# Patient Record
Sex: FEMALE | Race: WHITE | NOT HISPANIC OR LATINO | Employment: PART TIME | URBAN - METROPOLITAN AREA
[De-identification: names, ages, dates, MRNs, and addresses within clinical notes are randomized per-mention and may not be internally consistent; named-entity substitution may affect disease eponyms.]

---

## 2018-01-12 ENCOUNTER — ALLSCRIPTS OFFICE VISIT (OUTPATIENT)
Dept: OTHER | Facility: OTHER | Age: 43
End: 2018-01-12

## 2018-01-12 DIAGNOSIS — Z12.31 ENCOUNTER FOR SCREENING MAMMOGRAM FOR MALIGNANT NEOPLASM OF BREAST: ICD-10-CM

## 2018-01-14 LAB
BACTERIA UR QL AUTO: ABNORMAL
BILIRUB UR QL STRIP: NEGATIVE
CHARACTER, URINE (HISTORICAL): CLEAR
COLOR UR: YELLOW
CRYSTAL TYPE (HISTORICAL): ABNORMAL PERHPF
CRYSTALS URNS QL MICRO: ABNORMAL
CULTURE RESULT (HISTORICAL): NO GROWTH
FINE GRAN CASTS URNS QL MICRO: ABNORMAL PERLPF (ref 0–1)
GLUCOSE UR STRIP-MCNC: NEGATIVE MG/DL
HGB UR QL STRIP.AUTO: NEGATIVE
HYALINE CASTS #/AREA URNS LPF: ABNORMAL PERLPF (ref 0–4)
KETONES UR STRIP-MCNC: ABNORMAL MG/DL
LEUKOCYTE ESTERASE UR QL STRIP: ABNORMAL
NITRITE UR QL STRIP: NEGATIVE
NON-SQ EPI CELLS URNS QL MICRO: ABNORMAL
PH UR STRIP.AUTO: 6 [PH] (ref 5–8)
PROT UR-MCNC: NEGATIVE G/DL
RBC CASTS URNS QL MICRO: ABNORMAL PERLPF (ref 0–1)
SP GR UR STRIP.AUTO: 1.02 (ref 1–1.03)
URINE RBC (HISTORICAL): ABNORMAL PERHPF
UROBILINOGEN UR QL STRIP.AUTO: 0.2 MG/DL (ref 0.2–1)
WBC #/AREA URNS AUTO: ABNORMAL PERHPF (ref 0–4)

## 2018-01-16 NOTE — PROGRESS NOTES
Plan    Contraception management, Encounter for routine gynecological examination with    Papanicolaou smear of cervix    · Norgestim-Eth Estrad Triphasic 0 18/0 215/0 25 MG-35 MCG Oral Tablet; Take 1    tablet daily   Rx By: Sahil Teague; Dispense: 90 Days ; #:90 Tablet; Refill: 0;For: Contraception management, Encounter for routine gynecological examination with Papanicolaou smear of cervix; ESTELLA = N; Verified Transmission to [employer]; Last Updated By: System, [employer]; 1/12/2018 1:43:24 PM  Encounter for routine gynecological examination with Papanicolaou smear of cervix    · Norgestim-Eth Estrad Triphasic 0 18/0 215/0 25 MG-35 MCG Oral Tablet (Ortho    Tri-Cyclen (28)); TAKE 1 TABLET DAILY   Rx By: Sahil Teague; Dispense: 30 Days ; #:30 Tablet; Refill: 0;For: Encounter for routine gynecological examination with Papanicolaou smear of cervix; ESTELLA = N; Verified Transmission to [address] [place] [address]; Last Updated By: [employer]; 1/12/2018 1:39:15 PM      * MAMMO SCREENING BILATERAL W CAD; Status:Hold For - Scheduling; Requested for:12Jan2018; Perform:[facility]; TAR:[id number];FLTKCAB;       [facility] Route for screening mammogram for malignant neoplasm of breast; Ordered By:Orestes Ku;      (B) URINALYSIS, ROUTINE (W/MICROS); Status:Resulted - Requires Verification;   Done: 12EPR8980 12:00AM     FIF:[id number];IVIDYCN; For:Urinary frequency; Ordered By:Orestes Ku;      (B) URINE CULTURE; Status:Resulted - Requires Verification;   Done: 00YUF8573 12:00AM     GUB:[id number];XYSWUME; For:Urinary frequency; Ordered By:Cecille Ku; Discussion/Summary   health maintenance visit healthy adult female Currently, she eats a healthy diet and has an adequate exercise regimen   the risks and benefits of cervical cancer screening were discussed cervical cancer screening is current cervical cancer screening is needed every three years Testing was done today for declines  Breast cancer screening: the risks and benefits of breast cancer screening were discussed, self breast exam technique was taught, monthly self breast exam was advised and mammogram has been ordered  Colorectal cancer screening: colorectal cancer screening is not indicated  Osteoporosis screening: bone mineral density testing is not indicated  The immunizations are up to date  Advice and education were given regarding nutrition, calcium supplements, vitamin D supplements, reproductive health and contraception  Patient discussion: discussed with the patient  Reviewed normal exam today  Pap was not done  breast exam today  Reviewed monthly SBE week after menses  Advised mammogram, ordered  Pt stated she has rx from family doctor  Reviewed how to schedule, no lotions, deodorant, powders, etc  day of mammogram  with patient Urine dip in office today showed mild traces of blood  Will send out for U/A and culture  Reviewed patient should follow up with PCP, or urgent care, if right flank pain becomes worse, or develops fever/chills  Reviewed if urinalysis and culture come back negative pt should follow with PCP for further evaluation  Will call with results of U/A and culture  send rx to refill [facility] - one month to [place] and [facility] follows with PCP to monitor thyroid levels states recently changed dose, feeling ok  1 year for annual or sooner as needed  The patient has the current Goals: Have mammogram monthly SBE  The patent has the current Barriers: None  Patient is able to Self-Care  Possible side effects of new medications were reviewed with the patient/guardian today  The treatment plan was reviewed with the patient/guardian   The patient/guardian understands and agrees with the treatment plan       Self Referrals: No      Chief Complaint   Pt is here for an annual well woman exam       History of Present Illness   HPI: Pt here for annual well woman exam  Last exam Nov 2016 Pap with reflex to HPV done at that time WNL, Pap in 2015 WNL HPV negative  Reviewed pap guidelines  Declines pap today  monthly lasting 5-7 days  LMP 3 weeks ago  menses being too heavy, painful, or crampy  Denies any bleeding in between menses  any abnormal vaginal discharge, itching, or odor  does not practice SBE but denies any complaints today  Has not gone for mammogram  is in a mutually exclusive relationship () with a male partner and denies the need for STD/ HIV/ Hepatitis testing  OCPs Ortho Tri Cyclin, no complaints, would like to continue  any bowel issues  Pt complaining or lower right back pain x 1 week and urinary frequency x 2-3 days, pt denies any dysuria, hesitancy, blood in urine  Denies any fever or chills  with PCP for regular check-ups and blood work  GYN , Adult Female [facility]: The patient is being seen for a health maintenance evaluation  The last health maintenance visit was 1 year(s) ago  Social History: Household members include spouse  She is   Work status: occupation: Pharmacy tech  The patient is a former cigarette smoker  She reports never drinking alcohol  The patient has no concerns about alcohol abuse  She has never used illicit drugs  General Health: The patient's health since the last visit is described as good  She has regular dental visits  -- She denies vision problems  -- She denies hearing loss  Immunizations status: up to date  Lifestyle:  She consumes a diverse and healthy diet  -- She does not have any weight concerns  -- She exercises regularly  -- She does not use tobacco -- She denies alcohol use  -- She denies drug use  Reproductive health: the patient is premenopausal--   she reports no menstrual problems  Menstrual history:  age at menarche was 16  LMP: the last menstrual period was 12/22/17  Recent menstrual periods: bleeding has been normal  The cycles have been regular   The duration of her recent periods has been regular  -- she uses contraception  For contraception, she uses oral contraception pills  -- she is sexually active  She is monogamous with a male partner  -- she denies prior pregnancies G 0  Screening: cancer screening reviewed and current  metabolic screening reviewed and current  risk screening reviewed and current  Review of Systems   premenopausal,-- urinary frequency-- and-- flank pain, but-- no pelvic pain,-- no pelvic pressure,-- no vaginal pain,-- no vaginal discharge,-- no vaginal itching,-- no vaginal lump or mass,-- no vaginal odor,-- no nonmenstrual bleeding,-- did not miss the most recent period,-- not thinking she may be pregnant,-- no vulvar pain,-- no vulvar itching,-- no vulvar lump or mass,-- no labial swelling,-- no dysmenorrhea,-- denied amenorrhea,-- no menorrhagia,-- no hypomenorrhea,-- no oligomenorrhea,-- no decreased time between periods,-- periods are regular,-- regular length of periods,-- no dysuria,-- no bladder pain,-- no hematuria,-- no burning sensation during urination,-- no feelings of urinary urgency,-- no abnormal urethral discharge,-- urine is not foul-smelling-- and-- no urinary hesitancy  Constitutional: No fever, no chills, feels well, no tiredness, no recent weight gain or loss  Cardiovascular: no complaints of slow or fast heart rate, no chest pain, no palpitations, no leg claudication or lower extremity edema  Respiratory: no complaints of shortness of breath, no wheezing, no dyspnea on exertion, no orthopnea or PND  Breasts: no complaints of breast pain, breast lump or nipple discharge  Gastrointestinal: no complaints of abdominal pain, no constipation, no nausea or diarrhea, no vomiting, no bloody stools  Genitourinary: no complaints of dysuria, no incontinence, no pelvic pain, no dysmenorrhea, no vaginal discharge or abnormal vaginal bleeding        Musculoskeletal: no complaints of arthralgia, no myalgia, no joint swelling or stiffness, no limb pain or swelling  Integumentary: no complaints of skin rash or lesion, no itching or dry skin, no skin wounds  Neurological: no complaints of headache, no confusion, no numbness or tingling, no dizziness or fainting  ROS reviewed  OB History   Pregnancy History (Brief):      Prior pregnancies: : 0  Para: Active Problems   1  Contraception management (V25 9) (Z30 9)   2  Encounter for routine gynecological examination with Papanicolaou smear of cervix     (V72 31,V76 2) (Z01 419)   3  Encounter for screening mammogram for malignant neoplasm of breast (V76 12)     (Z12 31)    Past Medical History    · History of  0 (V49 89)   · History of hypothyroidism (V12 29) (Z86 39)     The active problems and past medical history were reviewed and updated today  Surgical History    · History of Oral Surgery Tooth Extraction Gideon Tooth     The surgical history was reviewed and updated today  Family History   Mother    · No pertinent family history  Father    · No pertinent family history  Maternal Grandmother    · Family history of diabetes mellitus (V18 0) (Z83 3)  Maternal Grandfather    · Family history of malignant melanoma (V16 8) (Z80 8)     The family history was reviewed and updated today  Social History    · Former smoker (L54 46) (Z95 642)   · Denied: History of H/O domestic violence   · Monogamous relationship   · No alcohol use   · No drug use   · Sexually active  The social history was reviewed and updated today  The social history was reviewed and is unchanged  Current Meds    1  Levothyroxine Sodium 175 MCG Oral Tablet; Therapy: [id number] to Recorded   2  Multi Vitamin Daily TABS; Therapy: (Recorded:2016) to Recorded   3  Norgestim-Eth Estrad Triphasic 0 18/0 215/0 25 MG-35 MCG Oral Tablet; TAKE 1 TABLET     DAILY;      Therapy: 03AHF0752 to (Jeremiah Doshi)  Requested for: 98Yrr8415; Last     Rx:[id number]; Status: ACTIVE - Retrospective By Protocol Authorization Ordered   4  TriNessa (28) 0 18/0 215/0 25 MG-35 MCG Oral Tablet; take 1 tablet by mouth every day; Therapy: 40Wlv8862 to (Evaluate:30Nov2016)  Requested for: 60RFM3196; Last     Rx:[id number] Ordered   5  Tyrosine 500 MG TABS; Therapy: (Recorded:08Nov2016) to Recorded    Allergies   1  No Known Drug Allergies    Vitals    Recorded: 05TTE4673 63:94GG   Systolic 489   Diastolic 68   Height 5 ft 9 in   Weight 144 lb    BMI Calculated 21 27   BSA Calculated 1 8     Physical Exam        Constitutional      General appearance: No acute distress, well appearing and well nourished  Neck      Neck: Normal, supple, trachea midline, no masses  Thyroid: Normal, no thyromegaly  Pulmonary      Respiratory effort: No increased work of breathing or signs of respiratory distress  Auscultation of lungs: Clear to auscultation  Cardiovascular      Auscultation of heart: Normal rate and rhythm, normal S1 and S2, no murmurs  Peripheral vascular exam: Normal pulses Throughout  Genitourinary      External genitalia: Normal and no lesions appreciated  Vagina: Normal, no lesions or dryness appreciated  Urethra: Normal        Urethral meatus: Normal        Bladder: Normal, soft, non-tender and no prolapse or masses appreciated  Cervix: Normal, no palpable masses  Uterus: Normal, non-tender, not enlarged, and no palpable masses  Adnexa/parametria: Normal, non-tender and no fullness or masses appreciated  Declined      Chest      Breasts: Normal and no dimpling or skin changes noted  Abdomen      Abdomen: Normal, non-tender, and no organomegaly noted  Liver and spleen: No hepatomegaly or splenomegaly  Examination for hernias: No hernias appreciated  -- declined        Lymphatic      Palpation of lymph nodes in neck, axillae, groin and/or other locations: No lymphadenopathy or masses noted  Skin      Skin and subcutaneous tissue: Normal skin turgor and no rashes  Palpation of skin and subcutaneous tissue: Normal        Psychiatric      Orientation to person, place, and time: Normal        Mood and affect: Normal        Attending Note   Collaborating Physician Note: Collaborating Note: I supervised the Advanced Practitioner-- and-- I agree with the Advanced Practitioner note   I discussed the case with the Advanced Practitioner and reviewed the AP note      Signatures    Electronically signed by : Nithya Farah; Jan 12 2018  2:04PM EST                       (Author)     Electronically signed by : GRECIA Gracia ; Jose 15 2018  6:34PM EST                       (Author)

## 2018-01-23 VITALS
WEIGHT: 144 LBS | HEIGHT: 69 IN | DIASTOLIC BLOOD PRESSURE: 68 MMHG | BODY MASS INDEX: 21.33 KG/M2 | SYSTOLIC BLOOD PRESSURE: 108 MMHG

## 2018-04-13 ENCOUNTER — TELEPHONE (OUTPATIENT)
Dept: OBGYN CLINIC | Facility: CLINIC | Age: 43
End: 2018-04-13

## 2018-04-13 DIAGNOSIS — Z30.41 ENCOUNTER FOR SURVEILLANCE OF CONTRACEPTIVE PILLS: Primary | ICD-10-CM

## 2018-04-13 RX ORDER — NORGESTIMATE AND ETHINYL ESTRADIOL 7DAYSX3 28
1 KIT ORAL DAILY
Qty: 90 TABLET | Refills: 2 | Status: SHIPPED | OUTPATIENT
Start: 2018-04-13 | End: 2019-02-12 | Stop reason: SDUPTHER

## 2018-04-13 RX ORDER — NORGESTIMATE AND ETHINYL ESTRADIOL 7DAYSX3 28
1 KIT ORAL DAILY
Qty: 28 TABLET | Refills: 0 | Status: SHIPPED | OUTPATIENT
Start: 2018-04-13 | End: 2018-04-23 | Stop reason: SDUPTHER

## 2018-04-13 NOTE — TELEPHONE ENCOUNTER
Reviewed and talked with patient  Apologized and will send 1 month refill of orthotricyclen to [facility] locally and sent additional [facility]    Refill were routed to [place] for sign off

## 2018-04-23 DIAGNOSIS — Z30.41 ENCOUNTER FOR SURVEILLANCE OF CONTRACEPTIVE PILLS: ICD-10-CM

## 2018-04-23 RX ORDER — NORGESTIMATE AND ETHINYL ESTRADIOL 7DAYSX3 28
1 KIT ORAL DAILY
Qty: 28 TABLET | Refills: 3 | Status: SHIPPED | OUTPATIENT
Start: 2018-04-23 | End: 2019-03-28

## 2019-02-12 DIAGNOSIS — Z30.41 ENCOUNTER FOR SURVEILLANCE OF CONTRACEPTIVE PILLS: ICD-10-CM

## 2019-02-13 RX ORDER — NORGESTIMATE AND ETHINYL ESTRADIOL 7DAYSX3 28
KIT ORAL
Qty: 84 TABLET | Refills: 0 | Status: SHIPPED | OUTPATIENT
Start: 2019-02-13 | End: 2019-03-28 | Stop reason: SDUPTHER

## 2019-03-28 ENCOUNTER — ANNUAL EXAM (OUTPATIENT)
Dept: OBGYN CLINIC | Facility: CLINIC | Age: 44
End: 2019-03-28
Payer: COMMERCIAL

## 2019-03-28 VITALS
BODY MASS INDEX: 25.18 KG/M2 | SYSTOLIC BLOOD PRESSURE: 108 MMHG | DIASTOLIC BLOOD PRESSURE: 64 MMHG | WEIGHT: 170 LBS | HEIGHT: 69 IN

## 2019-03-28 DIAGNOSIS — Z01.419 ENCOUNTER FOR GYNECOLOGICAL EXAMINATION (GENERAL) (ROUTINE) WITHOUT ABNORMAL FINDINGS: Primary | ICD-10-CM

## 2019-03-28 DIAGNOSIS — Z30.41 ENCOUNTER FOR SURVEILLANCE OF CONTRACEPTIVE PILLS: ICD-10-CM

## 2019-03-28 DIAGNOSIS — Z12.31 ENCOUNTER FOR SCREENING MAMMOGRAM FOR MALIGNANT NEOPLASM OF BREAST: ICD-10-CM

## 2019-03-28 PROCEDURE — S0612 ANNUAL GYNECOLOGICAL EXAMINA: HCPCS | Performed by: PHYSICIAN ASSISTANT

## 2019-03-28 RX ORDER — LEVOTHYROXINE SODIUM 0.15 MG/1
TABLET ORAL
Refills: 3 | COMMUNITY
Start: 2019-03-18 | End: 2020-08-10

## 2019-03-28 RX ORDER — NORGESTIMATE AND ETHINYL ESTRADIOL 7DAYSX3 28
1 KIT ORAL DAILY
Qty: 84 TABLET | Refills: 3 | Status: SHIPPED | OUTPATIENT
Start: 2019-03-28 | End: 2020-03-11 | Stop reason: SDUPTHER

## 2019-03-28 NOTE — PROGRESS NOTES
Assessment/Plan   Diagnoses and all orders for this visit:    Encounter for gynecological examination (general) (routine) without abnormal findings  -     GP Liquid-Based Pap + HPV Plus  The current ASCCP guidelines were reviewed  Patient's last pap was 11/8/16 - WNL and therefore, since it has been almost 2 5 years a pap with HPV cotesting was collected today  I emphasized the importance of an annual pelvic and breast exam  Patient ok to have a pap done today  Encounter for screening mammogram for malignant neoplasm of breast  -     Mammo screening bilateral w 3d & cad; Future  A yearly mammogram is recommended for breast cancer screening starting at age 36;    Encounter for surveillance of contraceptive pills  -     norgestimate-ethinyl estradiol (TRI-ESTARYLLA) 0 18/0 215/0 25 MG-35 MCG per tablet; Take 1 tablet by mouth daily  Contraception - continue Tri-estarylla 1 tab po daily    Discussion  I have discussed the importance of monthly self-breast exams, exercise and healthy diet as well as adequate intake of calcium and vitamin D  Encourage MVI q day and r/ronny importance of folic acid; Encourage 30-40 min weight bearing exercise most days of week  Encourage safe sexual practices; STD testing - declines  All questions have been answered to her satisfaction  RTO for APE or sooner if needed    Subjective     HPI   Hemalathajonathan Rich is a 44 y o  female who presents for annual well woman exam    Menarche - 13; LMP - 3/20/19; Periods are reg q month and last 5-7 days; No excessive bleeding; No intermenstrual bleeding or spotting; Cramps are tolerable  No vulvar itch/burn; No vaginal itch/burn; No abn discharge or odor; No urinary sx - burning/pain/frequency/hematuria  (-) SBEs - no asymmetry, nipple discharge or bleeding, changes in skin of breast, or breast tenderness bilaterally  No abd/pelvic pain or HAs;    No menopausal symptoms: No hot flashes/night sweats, problems with intercourse, vaginal dryness; sleeping well;   Pt is sexually active in a mutually monog/ sexual relationship; No issues with intercourse; She declines std/hiv/hep testing; Feels safe at home  Current contraception: OCP  (+) PCP for routine Bw/care; Last Pap - 16 - WNL  History of abnormal Pap smear: no  Last mammo - never      Review of Systems   Constitutional: Negative for activity change, fatigue, fever and unexpected weight change  HENT: Negative for congestion, dental problem, sinus pressure and sinus pain  Eyes: Negative for visual disturbance  Respiratory: Negative for cough, shortness of breath and wheezing  Cardiovascular: Negative for chest pain and leg swelling  Gastrointestinal: Negative for abdominal distention, abdominal pain, blood in stool, constipation, diarrhea, nausea and vomiting  Endocrine: Negative for polydipsia  Genitourinary: Negative for difficulty urinating, dyspareunia, dysuria, frequency, hematuria, menstrual problem, pelvic pain, urgency, vaginal bleeding, vaginal discharge and vaginal pain  Musculoskeletal: Negative for arthralgias and back pain  Allergic/Immunologic: Negative for environmental allergies  Neurological: Negative for dizziness, seizures and headaches  Psychiatric/Behavioral: Negative for dysphoric mood and sleep disturbance  The patient is not nervous/anxious          The following portions of the patient's history were reviewed and updated as appropriate: allergies, current medications, past family history, past medical history, past social history, past surgical history and problem list          OB History        0    Para   0    Term   0       0    AB   0    Living   0       SAB   0    TAB   0    Ectopic   0    Multiple   0    Live Births   0                 Past Medical History:   Diagnosis Date    Asthma     Hypothyroid        Past Surgical History:   Procedure Laterality Date    WISDOM TOOTH EXTRACTION         Family History   Problem Relation Age of Onset    No Known Problems Mother     No Known Problems Father     Melanoma Maternal Grandfather        Social History     Socioeconomic History    Marital status: Single     Spouse name: Not on file    Number of children: Not on file    Years of education: Not on file    Highest education level: Not on file   Occupational History    Not on file   Social Needs    Financial resource strain: Not on file    Food insecurity:     Worry: Not on file     Inability: Not on file    Transportation needs:     Medical: Not on file     Non-medical: Not on file   Tobacco Use    Smoking status: Current Every Day Smoker    Smokeless tobacco: Never Used    Tobacco comment: E- cigarettes - working on quitting   Substance and Sexual Activity    Alcohol use:  Yes     Alcohol/week: 0 6 oz     Types: 1 Standard drinks or equivalent per week    Drug use: Never    Sexual activity: Yes     Partners: Male     Birth control/protection: OCP   Lifestyle    Physical activity:     Days per week: Not on file     Minutes per session: Not on file    Stress: Not on file   Relationships    Social connections:     Talks on phone: Not on file     Gets together: Not on file     Attends Mandaeism service: Not on file     Active member of club or organization: Not on file     Attends meetings of clubs or organizations: Not on file     Relationship status: Not on file    Intimate partner violence:     Fear of current or ex partner: Not on file     Emotionally abused: Not on file     Physically abused: Not on file     Forced sexual activity: Not on file   Other Topics Concern    Not on file   Social History Narrative    Not on file         Current Outpatient Medications:     levothyroxine 150 mcg tablet, TK 1 T PO QD, Disp: , Rfl: 3    MULTIPLE VITAMIN PO, Take by mouth, Disp: , Rfl:     norgestimate-ethinyl estradiol (TRI-ESTARYLLA) 0 18/0 215/0 25 MG-35 MCG per tablet, Take 1 tablet by mouth daily, Disp: 84 tablet, Rfl: 3    No Known Allergies    Objective   Vitals:    03/28/19 1132   BP: 108/64   BP Location: Left arm   Patient Position: Sitting   Cuff Size: Large   Weight: 77 1 kg (170 lb)   Height: 5' 9" (1 753 m)     Physical Exam   Constitutional: She appears well-developed and well-nourished  No distress  Neck: No thyromegaly present  Cardiovascular: Normal rate, regular rhythm and normal heart sounds  No murmur heard  Pulmonary/Chest: Effort normal and breath sounds normal  No respiratory distress  She has no wheezes  Right breast exhibits no inverted nipple, no mass, no nipple discharge, no skin change and no tenderness  Left breast exhibits no inverted nipple, no mass, no nipple discharge, no skin change and no tenderness  Breasts are symmetrical    Abdominal: Soft  She exhibits no distension and no mass  There is no tenderness  Genitourinary: Vagina normal and uterus normal  Rectal exam shows no mass, no tenderness, anal tone normal and guaiac negative stool  Pelvic exam was performed with patient supine  There is no rash, tenderness or lesion on the right labia  There is no rash, tenderness or lesion on the left labia  Uterus is not deviated, not enlarged, not fixed and not tender  Cervix exhibits no motion tenderness, no discharge and no friability  Right adnexum displays no mass, no tenderness and no fullness  Left adnexum displays no mass, no tenderness and no fullness  No erythema, tenderness or bleeding in the vagina  No foreign body in the vagina  No vaginal discharge found  Musculoskeletal: She exhibits no edema  Lymphadenopathy:     She has no cervical adenopathy  She has no axillary adenopathy  Right: No inguinal adenopathy present  Left: No inguinal adenopathy present  Neurological: She is alert  Skin: Skin is warm  She is not diaphoretic  Psychiatric: She has a normal mood and affect  Her behavior is normal    Vitals reviewed

## 2019-03-28 NOTE — ASSESSMENT & PLAN NOTE
The current ASCCP guidelines were reviewed  Patient's last pap was 11/8/16 - WNL and therefore, since it has been almost 2 5 years a pap with HPV cotesting was collected today  I emphasized the importance of an annual pelvic and breast exam  Patient ok to have a pap done today

## 2019-04-03 LAB
HPV HR 12 DNA CVX QL NAA+PROBE: NOT DETECTED
HPV16 DNA SPEC QL NAA+PROBE: NOT DETECTED
HPV18 DNA SPEC QL NAA+PROBE: NOT DETECTED
THIN PREP CVX: NORMAL

## 2019-08-15 ENCOUNTER — TELEPHONE (OUTPATIENT)
Dept: OBGYN CLINIC | Facility: CLINIC | Age: 44
End: 2019-08-15

## 2019-08-15 NOTE — TELEPHONE ENCOUNTER
Darya OT with [facility]-- [phone] Amanda Dumas R.N.     LMOM reminding pt of mammo order, advised to call with questions and to schedule accordingly

## 2020-03-07 DIAGNOSIS — Z30.41 ENCOUNTER FOR SURVEILLANCE OF CONTRACEPTIVE PILLS: ICD-10-CM

## 2020-03-09 RX ORDER — NORGESTIMATE AND ETHINYL ESTRADIOL 7DAYSX3 28
KIT ORAL
Qty: 84 TABLET | Refills: 3 | OUTPATIENT
Start: 2020-03-09

## 2020-03-11 ENCOUNTER — TELEPHONE (OUTPATIENT)
Dept: OBGYN CLINIC | Facility: CLINIC | Age: 45
End: 2020-03-11

## 2020-03-11 DIAGNOSIS — Z30.41 ENCOUNTER FOR SURVEILLANCE OF CONTRACEPTIVE PILLS: ICD-10-CM

## 2020-03-11 RX ORDER — NORGESTIMATE AND ETHINYL ESTRADIOL 7DAYSX3 28
1 KIT ORAL DAILY
Qty: 84 TABLET | Refills: 0 | Status: SHIPPED | OUTPATIENT
Start: 2020-03-11 | End: 2020-06-08

## 2020-05-31 DIAGNOSIS — E03.9 HYPOTHYROIDISM (ACQUIRED): Primary | ICD-10-CM

## 2020-05-31 RX ORDER — LEVOTHYROXINE SODIUM 0.1 MG/1
TABLET ORAL
Qty: 30 TABLET | Refills: 3 | Status: SHIPPED | OUTPATIENT
Start: 2020-05-31 | End: 2020-10-14 | Stop reason: SDUPTHER

## 2020-06-04 DIAGNOSIS — Z30.41 ENCOUNTER FOR SURVEILLANCE OF CONTRACEPTIVE PILLS: ICD-10-CM

## 2020-06-08 RX ORDER — NORGESTIMATE AND ETHINYL ESTRADIOL 7DAYSX3 28
KIT ORAL
Qty: 84 TABLET | Refills: 0 | Status: SHIPPED | OUTPATIENT
Start: 2020-06-08 | End: 2020-09-02 | Stop reason: SDUPTHER

## 2020-08-03 ENCOUNTER — TELEPHONE (OUTPATIENT)
Dept: FAMILY MEDICINE CLINIC | Facility: CLINIC | Age: 45
End: 2020-08-03

## 2020-08-03 NOTE — TELEPHONE ENCOUNTER
Patient has a appointment for 08/10/2020 requesting a order for routine labs to have completed before appointment, if completed please call patient back  Thanks   ND

## 2020-08-06 DIAGNOSIS — Z00.00 HEALTHY ADULT ON ROUTINE PHYSICAL EXAMINATION: Primary | ICD-10-CM

## 2020-08-10 ENCOUNTER — OFFICE VISIT (OUTPATIENT)
Dept: FAMILY MEDICINE CLINIC | Facility: CLINIC | Age: 45
End: 2020-08-10
Payer: COMMERCIAL

## 2020-08-10 VITALS
OXYGEN SATURATION: 99 % | SYSTOLIC BLOOD PRESSURE: 100 MMHG | TEMPERATURE: 97.2 F | DIASTOLIC BLOOD PRESSURE: 70 MMHG | BODY MASS INDEX: 27.28 KG/M2 | HEART RATE: 72 BPM | HEIGHT: 68 IN | WEIGHT: 180 LBS | RESPIRATION RATE: 16 BRPM

## 2020-08-10 DIAGNOSIS — E03.9 ACQUIRED HYPOTHYROIDISM: Primary | ICD-10-CM

## 2020-08-10 DIAGNOSIS — Z00.00 ANNUAL PHYSICAL EXAM: ICD-10-CM

## 2020-08-10 PROCEDURE — 99396 PREV VISIT EST AGE 40-64: CPT | Performed by: FAMILY MEDICINE

## 2020-08-10 NOTE — PROGRESS NOTES
Assessment/Plan:         Problem List Items Addressed This Visit        Endocrine    Acquired hypothyroidism - Primary     Had dose adjustment 2/20  Due for TSH         Relevant Orders    TSH, 3rd generation       Other    Annual physical exam     Send for routine labs         Relevant Orders    CBC and differential    Comprehensive metabolic panel    Lipid panel    Urinalysis with microscopic             pt here for annual physical saw gyn 3/20 as script for mammo immunizations up to date     Patient ID: Lisa Loera is a 38 y o  female  HPI    The following portions of the patient's history were reviewed and updated as appropriate:   She has a past medical history of Asthma and Hypothyroid  ,  does not have any pertinent problems on file  ,   has a past surgical history that includes [place] tooth extraction  ,  family history includes Cancer in her maternal grandfather; Diabetes in her maternal grandmother; Melanoma in her maternal grandfather; No Known Problems in her father and mother; Thyroid disease in her sister  ,   reports that she has been smoking  She has never used smokeless tobacco  She reports current alcohol use of about 1 0 standard drinks of alcohol per week  She reports that she does not use drugs  ,  has No Known Allergies     Current Outpatient Medications   Medication Sig Dispense Refill    levothyroxine 100 mcg tablet TAKE 1 TABLET BY MOUTH DAILY 30 tablet 3    MULTIPLE VITAMIN PO Take by mouth      TRI-SPRINTEC 0 18/0 215/0 25 MG-35 MCG per tablet TAKE 1 TABLET BY MOUTH DAILY 84 tablet 0     No current facility-administered medications for this visit  Review of Systems   Constitutional: Negative for appetite change, chills, fatigue and fever  Respiratory: Negative for cough, chest tightness and shortness of breath  Cardiovascular: Negative for chest pain, palpitations and leg swelling  Gastrointestinal: Negative for abdominal pain, constipation, diarrhea, nausea and vomiting  Genitourinary: Negative for difficulty urinating and frequency  Musculoskeletal: Negative for arthralgias, back pain and neck pain  Skin: Negative for rash  Neurological: Negative for dizziness, weakness, light-headedness, numbness and headaches  Hematological: Does not bruise/bleed easily  Psychiatric/Behavioral: Negative for dysphoric mood and sleep disturbance  The patient is not nervous/anxious  Objective:  Vitals:    08/10/20 1358   BP: 100/70   BP Location: Left arm   Patient Position: Sitting   Pulse: 72   Resp: 16   Temp: (!) 97 2 °F (36 2 °C)   SpO2: 99%   Weight: 81 6 kg (180 lb)   Height: 5' 7 75" (1 721 m)     Body mass index is 27 57 kg/m²  Physical Exam  Vitals signs reviewed  Constitutional:       General: She is not in acute distress  Appearance: Normal appearance  She is well-developed  HENT:      Head: Normocephalic  Right Ear: Tympanic membrane, ear canal and external ear normal       Left Ear: Tympanic membrane, ear canal and external ear normal       Nose: Nose normal       Mouth/Throat:      Pharynx: No oropharyngeal exudate  Eyes:      General: Lids are normal       Conjunctiva/sclera: Conjunctivae normal       Pupils: Pupils are equal, round, and reactive to light  Neck:      Musculoskeletal: Normal range of motion and neck supple  Thyroid: No thyromegaly  Vascular: No carotid bruit  Cardiovascular:      Rate and Rhythm: Normal rate and regular rhythm  Pulses: Normal pulses  Heart sounds: Normal heart sounds  No murmur  No friction rub  Pulmonary:      Effort: Pulmonary effort is normal  No respiratory distress  Breath sounds: Normal breath sounds  No stridor  No wheezing, rhonchi or rales  Abdominal:      General: Bowel sounds are normal  There is no distension  Palpations: Abdomen is soft  There is no mass  Tenderness: There is no abdominal tenderness  There is no guarding  Hernia: No hernia is present  Musculoskeletal: Normal range of motion  Lymphadenopathy:      Cervical: No cervical adenopathy  Skin:     General: Skin is warm and dry  Comments: No abnormal appearing moles   Neurological:      Mental Status: She is alert and oriented to person, place, and time  Cranial Nerves: No cranial nerve deficit  Sensory: No sensory deficit  Motor: No tremor or abnormal muscle tone  Coordination: Coordination normal       Gait: Gait normal       Deep Tendon Reflexes: Reflexes normal    Psychiatric:         Speech: Speech normal          Behavior: Behavior normal          Thought Content: Thought content normal          Judgment: Judgment normal        BMI Counseling: Body mass index is 27 57 kg/m²  The BMI is above normal  Nutrition recommendations include consuming healthier snacks, decreasing soda and/or juice intake, moderation in carbohydrate intake and increasing intake of lean protein  Exercise recommendations include vigorous aerobic physical activity for 75 minutes/week and strength training exercises

## 2020-08-26 DIAGNOSIS — Z30.41 ENCOUNTER FOR SURVEILLANCE OF CONTRACEPTIVE PILLS: ICD-10-CM

## 2020-08-26 RX ORDER — NORGESTIMATE AND ETHINYL ESTRADIOL 7DAYSX3 28
KIT ORAL
Qty: 84 TABLET | Refills: 0 | OUTPATIENT
Start: 2020-08-26

## 2020-09-02 DIAGNOSIS — Z30.41 ENCOUNTER FOR SURVEILLANCE OF CONTRACEPTIVE PILLS: ICD-10-CM

## 2020-09-02 RX ORDER — NORGESTIMATE AND ETHINYL ESTRADIOL 7DAYSX3 28
1 KIT ORAL DAILY
Qty: 84 TABLET | Refills: 0 | Status: SHIPPED | OUTPATIENT
Start: 2020-09-02 | End: 2020-10-13 | Stop reason: SDUPTHER

## 2020-09-03 DIAGNOSIS — E78.00 ELEVATED CHOLESTEROL: Primary | ICD-10-CM

## 2020-09-03 LAB
ALBUMIN SERPL-MCNC: 4.2 G/DL (ref 3.6–5.1)
ALBUMIN/GLOB SERPL: 1.6 (CALC) (ref 1–2.5)
ALP SERPL-CCNC: 33 U/L (ref 31–125)
ALT SERPL-CCNC: 10 U/L (ref 6–29)
APPEARANCE UR: CLEAR
AST SERPL-CCNC: 18 U/L (ref 10–30)
BACTERIA UR QL AUTO: ABNORMAL /HPF
BASOPHILS # BLD AUTO: 59 CELLS/UL (ref 0–200)
BASOPHILS NFR BLD AUTO: 1.4 %
BILIRUB SERPL-MCNC: 0.6 MG/DL (ref 0.2–1.2)
BILIRUB UR QL STRIP: NEGATIVE
BUN SERPL-MCNC: 12 MG/DL (ref 7–25)
BUN/CREAT SERPL: NORMAL (CALC) (ref 6–22)
CALCIUM SERPL-MCNC: 9.6 MG/DL (ref 8.6–10.2)
CHLORIDE SERPL-SCNC: 102 MMOL/L (ref 98–110)
CHOLEST SERPL-MCNC: 224 MG/DL
CHOLEST/HDLC SERPL: 2.7 (CALC)
CO2 SERPL-SCNC: 28 MMOL/L (ref 20–32)
COLOR UR: ABNORMAL
CREAT SERPL-MCNC: 0.91 MG/DL (ref 0.5–1.1)
EOSINOPHIL # BLD AUTO: 147 CELLS/UL (ref 15–500)
EOSINOPHIL NFR BLD AUTO: 3.5 %
ERYTHROCYTE [DISTWIDTH] IN BLOOD BY AUTOMATED COUNT: 12.8 % (ref 11–15)
GLOBULIN SER CALC-MCNC: 2.7 G/DL (CALC) (ref 1.9–3.7)
GLUCOSE SERPL-MCNC: 81 MG/DL (ref 65–99)
GLUCOSE UR QL STRIP: NEGATIVE
HCT VFR BLD AUTO: 35.8 % (ref 35–45)
HDLC SERPL-MCNC: 82 MG/DL
HGB BLD-MCNC: 11.8 G/DL (ref 11.7–15.5)
HGB UR QL STRIP: NEGATIVE
HYALINE CASTS #/AREA URNS LPF: ABNORMAL /LPF
KETONES UR QL STRIP: ABNORMAL
LDLC SERPL CALC-MCNC: 125 MG/DL (CALC)
LEUKOCYTE ESTERASE UR QL STRIP: ABNORMAL
LYMPHOCYTES # BLD AUTO: 1718 CELLS/UL (ref 850–3900)
LYMPHOCYTES NFR BLD AUTO: 40.9 %
MCH RBC QN AUTO: 30.5 PG (ref 27–33)
MCHC RBC AUTO-ENTMCNC: 33 G/DL (ref 32–36)
MCV RBC AUTO: 92.5 FL (ref 80–100)
MONOCYTES # BLD AUTO: 416 CELLS/UL (ref 200–950)
MONOCYTES NFR BLD AUTO: 9.9 %
NEUTROPHILS # BLD AUTO: 1861 CELLS/UL (ref 1500–7800)
NEUTROPHILS NFR BLD AUTO: 44.3 %
NITRITE UR QL STRIP: NEGATIVE
NONHDLC SERPL-MCNC: 142 MG/DL (CALC)
PH UR STRIP: 6.5 [PH] (ref 5–8)
PLATELET # BLD AUTO: 254 THOUSAND/UL (ref 140–400)
PMV BLD REES-ECKER: 10.3 FL (ref 7.5–12.5)
POTASSIUM SERPL-SCNC: 4.3 MMOL/L (ref 3.5–5.3)
PROT SERPL-MCNC: 6.9 G/DL (ref 6.1–8.1)
PROT UR QL STRIP: ABNORMAL
RBC # BLD AUTO: 3.87 MILLION/UL (ref 3.8–5.1)
RBC #/AREA URNS HPF: ABNORMAL /HPF
SL AMB EGFR AFRICAN AMERICAN: 90 ML/MIN/1.73M2
SL AMB EGFR NON AFRICAN AMERICAN: 77 ML/MIN/1.73M2
SODIUM SERPL-SCNC: 139 MMOL/L (ref 135–146)
SP GR UR STRIP: 1.02 (ref 1–1.03)
SQUAMOUS #/AREA URNS HPF: ABNORMAL /HPF
TRIGL SERPL-MCNC: 80 MG/DL
TSH SERPL-ACNC: 3.32 MIU/L
WBC # BLD AUTO: 4.2 THOUSAND/UL (ref 3.8–10.8)
WBC #/AREA URNS HPF: ABNORMAL /HPF

## 2020-10-13 ENCOUNTER — ANNUAL EXAM (OUTPATIENT)
Dept: OBGYN CLINIC | Facility: CLINIC | Age: 45
End: 2020-10-13
Payer: COMMERCIAL

## 2020-10-13 VITALS
BODY MASS INDEX: 27.42 KG/M2 | DIASTOLIC BLOOD PRESSURE: 70 MMHG | TEMPERATURE: 97.7 F | WEIGHT: 179 LBS | SYSTOLIC BLOOD PRESSURE: 110 MMHG

## 2020-10-13 DIAGNOSIS — Z12.31 ENCOUNTER FOR SCREENING MAMMOGRAM FOR MALIGNANT NEOPLASM OF BREAST: ICD-10-CM

## 2020-10-13 DIAGNOSIS — Z71.85 HPV VACCINE COUNSELING: ICD-10-CM

## 2020-10-13 DIAGNOSIS — Z30.41 ENCOUNTER FOR SURVEILLANCE OF CONTRACEPTIVE PILLS: ICD-10-CM

## 2020-10-13 DIAGNOSIS — E03.9 HYPOTHYROIDISM (ACQUIRED): ICD-10-CM

## 2020-10-13 DIAGNOSIS — Z01.419 ENCOUNTER FOR GYNECOLOGICAL EXAMINATION (GENERAL) (ROUTINE) WITHOUT ABNORMAL FINDINGS: Primary | ICD-10-CM

## 2020-10-13 PROCEDURE — S0612 ANNUAL GYNECOLOGICAL EXAMINA: HCPCS | Performed by: PHYSICIAN ASSISTANT

## 2020-10-13 RX ORDER — NORGESTIMATE AND ETHINYL ESTRADIOL 7DAYSX3 28
1 KIT ORAL DAILY
Qty: 84 TABLET | Refills: 3 | Status: SHIPPED | OUTPATIENT
Start: 2020-10-13 | End: 2021-08-24

## 2020-10-14 RX ORDER — LEVOTHYROXINE SODIUM 0.1 MG/1
100 TABLET ORAL DAILY
Qty: 90 TABLET | Refills: 1 | Status: SHIPPED | OUTPATIENT
Start: 2020-10-14 | End: 2021-04-11

## 2020-11-21 DIAGNOSIS — Z30.41 ENCOUNTER FOR SURVEILLANCE OF CONTRACEPTIVE PILLS: ICD-10-CM

## 2020-11-23 RX ORDER — NORGESTIMATE AND ETHINYL ESTRADIOL 7DAYSX3 28
KIT ORAL
Qty: 84 TABLET | Refills: 3 | OUTPATIENT
Start: 2020-11-23

## 2020-11-24 ENCOUNTER — TELEPHONE (OUTPATIENT)
Dept: OBGYN CLINIC | Facility: CLINIC | Age: 45
End: 2020-11-24

## 2021-04-11 DIAGNOSIS — E03.9 HYPOTHYROIDISM (ACQUIRED): ICD-10-CM

## 2021-04-11 RX ORDER — LEVOTHYROXINE SODIUM 0.1 MG/1
TABLET ORAL
Qty: 90 TABLET | Refills: 1 | Status: SHIPPED | OUTPATIENT
Start: 2021-04-11

## 2022-01-12 DIAGNOSIS — Z30.41 ENCOUNTER FOR SURVEILLANCE OF CONTRACEPTIVE PILLS: ICD-10-CM

## 2022-01-12 RX ORDER — NORGESTIMATE AND ETHINYL ESTRADIOL 7DAYSX3 28
1 KIT ORAL DAILY
Qty: 84 TABLET | Refills: 0 | Status: SHIPPED | OUTPATIENT
Start: 2022-01-12

## 2022-01-24 ENCOUNTER — ANNUAL EXAM (OUTPATIENT)
Dept: OBGYN CLINIC | Facility: CLINIC | Age: 47
End: 2022-01-24

## 2022-01-24 VITALS
BODY MASS INDEX: 22.31 KG/M2 | DIASTOLIC BLOOD PRESSURE: 70 MMHG | HEIGHT: 69 IN | WEIGHT: 150.6 LBS | SYSTOLIC BLOOD PRESSURE: 104 MMHG

## 2022-01-24 DIAGNOSIS — Z12.31 ENCOUNTER FOR SCREENING MAMMOGRAM FOR MALIGNANT NEOPLASM OF BREAST: Primary | ICD-10-CM

## 2022-01-24 PROCEDURE — 99396 PREV VISIT EST AGE 40-64: CPT | Performed by: PHYSICIAN ASSISTANT

## 2022-01-24 NOTE — PROGRESS NOTES
Assessment/Plan   Diagnoses and all orders for this visit:    Encounter for screening mammogram for malignant neoplasm of breast  -     Mammo screening bilateral w 3d & cad; Future      Discussion  I have discussed the importance of monthly self-breast exams, exercise and healthy diet as well as adequate intake of calcium and vitamin D  Encourage 30-40 min weight bearing exercise most days of week    STI testing - declined    The current ASCCP guidelines were reviewed  Patient's last pap was 3/28/2019 and therefore, a pap with HPV cotesting is/is not indicated at this time  I emphasized the importance of an annual pelvic and breast exam  Patient ok to defer pap today  A yearly mammogram is recommended for breast cancer screening starting at age 36; In addition, colon cancer screening with a colonoscopy is recommended starting at age 36-53 and reviewed benefits  Discussed with patient the contraindication of use of e-cigarettes for combo OCPs  Patient aware that prescription for Tri-Sprintec will not be renewed at this time  Discussed progesterone and hormonal free contraceptive options including the mirena IUD  Patient provided with information regarding IUD and will check price as she has no insurance at this time  Also discussed option of vasectomy for her partner  Encouraged patient to seek care with PCP for cardiac problems for further work up  Educated patient to report to the ED for evaluation if she develops any chest pain, shortness of breath, weakness, LOC, or numbness or tingling  All questions have been answered to her satisfaction  RTO for APE or sooner if needed    Subjective     HPI   Lei Baldwin is a 40 y o  female who presents for annual well woman exam    LMP 1/12/21  Periods last about a week  Denies  bleeding  No vulvar itch/burn; No vaginal itch/burn; No abn discharge or odor;  No urinary sx - burning/pain/frequency/hematuria  (-) SBEs - no breast masses, asymmetry, nipple discharge or bleeding, changes in skin of breast, or breast tenderness bilaterally  No abd/pelvic pain or HAs; No menopausal symptoms: No hot flashes/night sweats, problems with intercourse, vaginal dryness; sleeping well  Pt is sexually active in a mutually  sexual relationship of 15 years; No issues with intercourse; She declines sti/hiv/hep testing; Feels safe at home  Contraception: Tri-Sprintec  Condom use: no   (+) PCP for routine Bw/care; Patient reports to regular nicotine vape use  Patient reports occasional feelings of her "heart skipping a beat" that occurs 1-2 times a week for the past few months  The episodes are not triggered by activity, and often occur at rest  They resolve on their own  There is no associated chest pain, tachycardia, SOB, dizziness, loss of consciousness, nausea or vomiting  Patient states that she is planning on making an appointment with her PCP to discuss this  Last Pap - 3/28/2019 with HPV, normal  History of abnormal Pap smear: no  Last mammo - Never done  Last colonoscopy - Never done    Review of Systems   Constitutional: Negative for chills, fatigue and fever  Respiratory: Negative for cough and shortness of breath  Cardiovascular: Negative for chest pain and palpitations  Gastrointestinal: Negative for abdominal pain, blood in stool, constipation, diarrhea, nausea and vomiting  Endocrine: Negative for cold intolerance, polydipsia and polyuria  Genitourinary: Negative for dyspareunia, dysuria, flank pain, frequency, genital sores, menstrual problem, pelvic pain, urgency and vaginal discharge  Neurological: Negative for dizziness, syncope and numbness       The following portions of the patient's history were reviewed and updated as appropriate: allergies, current medications, past family history, past medical history, past social history, past surgical history and problem list        OB History        0    Para   0    Term   0    0    AB   0    Living   0       SAB   0    IAB   0    Ectopic   0    Multiple   0    Live Births   0                 Past Medical History:   Diagnosis Date    Asthma     Hypothyroid        Past Surgical History:   Procedure Laterality Date    WISDOM TOOTH EXTRACTION         Family History   Problem Relation Age of Onset    No Known Problems Mother     No Known Problems Father     Melanoma Maternal Grandfather     Cancer Maternal Grandfather     Thyroid disease Sister     Diabetes Maternal Grandmother        Social History     Socioeconomic History    Marital status: Single     Spouse name: Not on file    Number of children: Not on file    Years of education: Not on file    Highest education level: Not on file   Occupational History    Not on file   Tobacco Use    Smoking status: Current Some Day Smoker    Smokeless tobacco: Never Used    Tobacco comment: E- cigarettes - working on quitting   Vaping Use    Vaping Use: Some days    Substances: Nicotine   Substance and Sexual Activity    Alcohol use:  Yes     Alcohol/week: 1 0 standard drink     Types: 1 Standard drinks or equivalent per week     Comment: occasional    Drug use: Never    Sexual activity: Yes     Partners: Male     Birth control/protection: OCP   Other Topics Concern    Not on file   Social History Narrative    Most recent tobacco use screenin2019     Social Determinants of Health     Financial Resource Strain: Not on file   Food Insecurity: Not on file   Transportation Needs: Not on file   Physical Activity: Not on file   Stress: Not on file   Social Connections: Not on file   Intimate Partner Violence: Not on file   Housing Stability: Not on file       Current Outpatient Medications:     levothyroxine 100 mcg tablet, TAKE 1 TABLET(100 MCG) BY MOUTH DAILY, Disp: 90 tablet, Rfl: 1    MULTIPLE VITAMIN PO, Take by mouth, Disp: , Rfl:     norgestimate-ethinyl estradiol (Tri-Sprintec) 0 18/0 215/0 25 MG-35 MCG per tablet, Take 1 tablet by mouth daily, Disp: 84 tablet, Rfl: 0    No Known Allergies    Objective   Vitals:    01/24/22 1223   BP: 104/70   Weight: 68 3 kg (150 lb 9 6 oz)   Height: 5' 9" (1 753 m)     Physical Exam  Vitals reviewed  Exam conducted with a chaperone present  Constitutional:       Appearance: Normal appearance  HENT:      Head: Normocephalic and atraumatic  Neck:      Comments: No thyromegaly  Cardiovascular:      Rate and Rhythm: Normal rate and regular rhythm  Pulses: Normal pulses  Heart sounds: Normal heart sounds  No murmur heard  No gallop  Pulmonary:      Effort: Pulmonary effort is normal       Breath sounds: Normal breath sounds  Chest:   Breasts: Breasts are symmetrical       Right: Normal  No mass, nipple discharge, skin change, tenderness or axillary adenopathy  Left: Normal  No mass, nipple discharge, skin change, tenderness or axillary adenopathy  Abdominal:      General: Abdomen is flat  Palpations: Abdomen is soft  There is no mass  Tenderness: There is no right CVA tenderness, left CVA tenderness or guarding  Genitourinary:     General: Normal vulva  Exam position: Lithotomy position  Labia:         Right: No rash, tenderness or lesion  Left: No rash, tenderness or lesion  Urethra: No urethral swelling or urethral lesion  Vagina: Vaginal discharge present  No erythema or lesions  Cervix: Normal       Uterus: Normal        Adnexa: Right adnexa normal and left adnexa normal         Right: No mass  Left: No mass  Rectum: Normal  Guaiac result negative  No mass, tenderness, anal fissure or external hemorrhoid  Normal anal tone  Comments: Nulliparous cervix visualized with mild white, thin discharge  Musculoskeletal:      Cervical back: Normal range of motion and neck supple  Lymphadenopathy:      Upper Body:      Right upper body: No axillary adenopathy        Left upper body: No axillary adenopathy  Skin:     General: Skin is warm and dry  Neurological:      General: No focal deficit present  Mental Status: She is alert  Psychiatric:         Mood and Affect: Mood normal          Behavior: Behavior normal        There are no Patient Instructions on file for this visit

## 2023-01-21 ENCOUNTER — HOSPITAL ENCOUNTER (OUTPATIENT)
Dept: MAMMOGRAPHY | Facility: HOSPITAL | Age: 48
Discharge: HOME | End: 2023-01-21

## 2023-01-21 VITALS — HEIGHT: 69 IN | BODY MASS INDEX: 24.44 KG/M2 | WEIGHT: 165 LBS

## 2023-01-21 DIAGNOSIS — Z12.31 ENCOUNTER FOR SCREENING MAMMOGRAM FOR MALIGNANT NEOPLASM OF BREAST: ICD-10-CM

## 2023-07-10 DIAGNOSIS — Z30.41 ENCOUNTER FOR SURVEILLANCE OF CONTRACEPTIVE PILLS: ICD-10-CM

## 2023-07-10 RX ORDER — NORGESTIMATE AND ETHINYL ESTRADIOL 7DAYSX3 28
1 KIT ORAL DAILY
Qty: 84 TABLET | Refills: 0 | Status: SHIPPED | OUTPATIENT
Start: 2023-07-10

## 2023-07-10 NOTE — TELEPHONE ENCOUNTER
Patient called [facility] - made APE with João Arreola in October 10, 2023 and will need a refill on her OCP until she is seen. She does not smoke anymore or vape, its been about 1-1.5 years.

## 2023-07-10 NOTE — TELEPHONE ENCOUNTER
Medication refill requested: Tri-Sprintec  Last office visit: 1/24/2022  Next office visit: 10/10/2023  Last refilled: 1/12/2022  Ordering Provider: Avi Werner PA-C    Pharmacy (select pharmacy send RX to):   [address] [address]  Phone: [phone] Fax: [phone]    Placed call to patient as the last refill was 1/12/2022 for a 90 day supply without refills. Patient would have been out of medication for over a year. Per patient, [place] and was receiving the medication there. She is now home and is in need of a refill. Will send to provider for review. Pended #[id number].

## 2023-11-08 DIAGNOSIS — Z30.41 ENCOUNTER FOR SURVEILLANCE OF CONTRACEPTIVE PILLS: ICD-10-CM

## 2023-11-08 RX ORDER — NORGESTIMATE AND ETHINYL ESTRADIOL 7DAYSX3 28
1 KIT ORAL DAILY
Qty: 84 TABLET | Refills: 0 | Status: SHIPPED | OUTPATIENT
Start: 2023-11-08

## 2024-01-26 NOTE — PROGRESS NOTES
Assessment/Plan   Problem List Items Addressed This Visit    None  Visit Diagnoses       Well woman exam    -  Primary    Encounter for screening mammogram for malignant neoplasm of breast        Relevant Orders    Mammo screening bilateral w 3d & cad    Encounter for surveillance of contraceptive pills        Relevant Medications    norgestimate-ethinyl estradiol (Tri-Sprintec) 0.18/0.215/0.25 MG-35 MCG per tablet            Discussion  I have discussed the importance of monthly self-breast exams, exercise and healthy diet as well as adequate intake of calcium and vitamin D.     Encourage safe sexual practices; STI testing - declines  Contraception - DANIEL    The current ASCCP guidelines were reviewed. Patient's last pap was 2019 and therefore, a pap with HPV cotesting is indicated at this time. She refuses today due to lack of insurance and cost. Will message  for possible price.    Mammogram is UTD; 01/2023 BIRADS1. New mammogram order placed.    Reviewed colorectal screening starts at age 45-50. Reviewed benefits. She will complete once she has insurance.     All questions have been answered to her satisfaction  RTO for APE or sooner if needed      Subjective     HPI   Brie Alba is a 48 y.o. female who presents for annual well woman exam.     LMP -01/03/24 ; Periods are reg q 28 days and last  5-7 days; No excessive bleeding; No intermenstrual bleeding or spotting; Cramps are tolerable.    No vulvar itch/burn; No vaginal itch/burn; No abn discharge or odor;     No urinary sx - burning/pain/frequency/hematuria    (-) SBEs - no family history of breast CA.    +pain in R hip.     No menopausal symptoms: No hot flashes/night sweats, problems with intercourse, vaginal dryness; sleeping well;     Pt is sexually active in a mutually monog/ sexual relationship; No issues with intercourse; She declines sti/hiv/hep testing; Feels safe at home    Current contraception: DANIEL  Condom use: no    (+)  PCP for routine Bw/care;    Review of Systems   Constitutional:  Negative for fatigue.   Eyes:  Negative for photophobia and visual disturbance.   Respiratory:  Negative for cough and shortness of breath.    Cardiovascular:  Negative for chest pain and palpitations.   Gastrointestinal:  Negative for abdominal pain, blood in stool, constipation, diarrhea, nausea and rectal pain.   Genitourinary:  Negative for dyspareunia, dysuria, flank pain, frequency, genital sores, menstrual problem, pelvic pain, urgency, vaginal bleeding, vaginal discharge and vaginal pain.   Musculoskeletal:  Negative for arthralgias and back pain.   Skin:  Negative for rash.   Neurological:  Negative for weakness and headaches.       The following portions of the patient's history were reviewed and updated as appropriate: allergies, current medications, past family history, past medical history, past social history, past surgical history, and problem list.         OB History          0    Para   0    Term   0       0    AB   0    Living   0         SAB   0    IAB   0    Ectopic   0    Multiple   0    Live Births   0                 Past Medical History:   Diagnosis Date    Asthma     Hypothyroid        Past Surgical History:   Procedure Laterality Date    WISDOM TOOTH EXTRACTION         Family History   Problem Relation Age of Onset    No Known Problems Mother     No Known Problems Father     Thyroid disease Sister     Diabetes Maternal Grandmother     Melanoma Maternal Grandfather     Cancer Maternal Grandfather     No Known Problems Paternal Grandmother     No Known Problems Paternal Grandfather     No Known Problems Maternal Aunt     No Known Problems Paternal Aunt        Social History     Socioeconomic History    Marital status: Single     Spouse name: Not on file    Number of children: Not on file    Years of education: Not on file    Highest education level: Not on file   Occupational History    Not on file   Tobacco Use     Smoking status: Former    Smokeless tobacco: Never    Tobacco comments:     E- cigarettes - working on quitting   Vaping Use    Vaping status: Former    Substances: Nicotine   Substance and Sexual Activity    Alcohol use: Not Currently     Alcohol/week: 1.0 standard drink of alcohol     Types: 1 Standard drinks or equivalent per week     Comment: occasional    Drug use: Never    Sexual activity: Yes     Partners: Male     Birth control/protection: OCP   Other Topics Concern    Not on file   Social History Narrative    Most recent tobacco use screenin2019     Social Determinants of Health     Financial Resource Strain: Low Risk  (2023)    Received from [facility]    Overall Financial Resource Strain (CARDIA)     Difficulty of Paying Living Expenses: Not very hard   Food Insecurity: No Food Insecurity (2023)    Received from [facility]    Hunger Vital Sign     Worried About Running Out of Food in the Last Year: Never true     Ran Out of Food in the Last Year: Never true   Transportation Needs: No Transportation Needs (2023)    Received from [facility]    PRAPARE - Transportation     Lack of Transportation (Medical): No     Lack of Transportation (Non-Medical): No   Physical Activity: Not on file   Stress: No Stress Concern Present (2023)    Received from [facility]    [place] [employer] - Occupational Stress Questionnaire     Feeling of Stress : Only a little   Social Connections: Socially Isolated (2023)    Received from [facility]    Social Connection and Isolation Panel [NHANES]     Frequency of Communication with Friends and Family: Once a week     Frequency of Social Gatherings with Friends and Family: Never     Attends [employer] Services: Never     Active Member of Clubs or Organizations: No     Attends Club or Organization Meetings: Never     Marital Status: Living  "with partner   Intimate Partner Violence: Not At Risk (9/28/2023)    Received from [facility]    Humiliation, Afraid, Rape, and Kick questionnaire     Fear of Current or Ex-Partner: No     Emotionally Abused: No     Physically Abused: No     Sexually Abused: No   Housing Stability: Low Risk  (9/28/2023)    Received from [facility]    Housing Stability Vital Sign     Unable to Pay for Housing in the Last Year: No     Number of Places Lived in the Last Year: 1     Unstable Housing in the Last Year: No         Current Outpatient Medications:     levothyroxine 100 mcg tablet, TAKE 1 TABLET(100 MCG) BY MOUTH DAILY (Patient taking differently: 125mg), Disp: 90 tablet, Rfl: 1    MULTIPLE VITAMIN PO, Take by mouth, Disp: , Rfl:     norgestimate-ethinyl estradiol (Tri-Sprintec) 0.18/0.215/0.25 MG-35 MCG per tablet, Take 1 tablet by mouth daily, Disp: 84 tablet, Rfl: 3    No Known Allergies    Objective   Vitals:    01/29/24 1224   BP: 108/66   BP Location: Left arm   Patient Position: Sitting   Cuff Size: Standard   Weight: 78.9 kg (174 lb)   Height: 5' 7.5\" (1.715 m)     Physical Exam  Vitals and nursing note reviewed.   Constitutional:       Appearance: Normal appearance. She is well-developed and normal weight.   HENT:      Head: Normocephalic and atraumatic.   Eyes:      Conjunctiva/sclera: Conjunctivae normal.   Cardiovascular:      Rate and Rhythm: Normal rate and regular rhythm.      Heart sounds: Normal heart sounds.   Pulmonary:      Effort: Pulmonary effort is normal.      Breath sounds: Normal breath sounds.   Chest:   Breasts:     Breasts are symmetrical.      Right: Normal. No inverted nipple, mass, nipple discharge, skin change or tenderness.      Left: Normal. No inverted nipple, mass, nipple discharge, skin change or tenderness.   Abdominal:      General: Abdomen is flat. There is no distension.      Palpations: Abdomen is soft. There is no mass.      Tenderness: There is " no abdominal tenderness. There is no right CVA tenderness or left CVA tenderness.   Genitourinary:     General: Normal vulva.      Exam position: Lithotomy position.      Pubic Area: No rash or pubic lice.       Labia:         Right: No rash or tenderness.         Left: No rash or tenderness.       Urethra: No urethral pain.      Vagina: Normal. No vaginal discharge.      Cervix: Normal.      Uterus: Normal. No uterine prolapse.       Adnexa: Right adnexa normal and left adnexa normal.        Right: No mass or tenderness.          Left: No mass or tenderness.     Musculoskeletal:         General: No tenderness. Normal range of motion.      Cervical back: Normal range of motion. No tenderness.      Right lower leg: No edema.      Left lower leg: No edema.   Lymphadenopathy:      Cervical: No cervical adenopathy.      Upper Body:      Right upper body: No supraclavicular or axillary adenopathy.      Left upper body: No supraclavicular or axillary adenopathy.      Lower Body: No right inguinal adenopathy. No left inguinal adenopathy.   Skin:     General: Skin is warm and dry.   Neurological:      Mental Status: She is alert and oriented to person, place, and time.      Motor: No weakness.   Psychiatric:         Mood and Affect: Mood normal.         Behavior: Behavior normal.         Thought Content: Thought content normal.         Judgment: Judgment normal.         There are no Patient Instructions on file for this visit.

## 2024-01-29 ENCOUNTER — ANNUAL EXAM (OUTPATIENT)
Dept: OBGYN CLINIC | Facility: CLINIC | Age: 49
End: 2024-01-29

## 2024-01-29 VITALS
BODY MASS INDEX: 26.37 KG/M2 | WEIGHT: 174 LBS | DIASTOLIC BLOOD PRESSURE: 66 MMHG | HEIGHT: 68 IN | SYSTOLIC BLOOD PRESSURE: 108 MMHG

## 2024-01-29 DIAGNOSIS — Z30.41 ENCOUNTER FOR SURVEILLANCE OF CONTRACEPTIVE PILLS: ICD-10-CM

## 2024-01-29 DIAGNOSIS — Z01.419 WELL WOMAN EXAM: Primary | ICD-10-CM

## 2024-01-29 DIAGNOSIS — Z12.31 ENCOUNTER FOR SCREENING MAMMOGRAM FOR MALIGNANT NEOPLASM OF BREAST: ICD-10-CM

## 2024-01-29 PROCEDURE — 99396 PREV VISIT EST AGE 40-64: CPT

## 2024-01-29 RX ORDER — NORGESTIMATE AND ETHINYL ESTRADIOL 7DAYSX3 28
1 KIT ORAL DAILY
Qty: 84 TABLET | Refills: 3 | Status: SHIPPED | OUTPATIENT
Start: 2024-01-29

## 2024-02-21 PROBLEM — Z01.419 ENCOUNTER FOR GYNECOLOGICAL EXAMINATION (GENERAL) (ROUTINE) WITHOUT ABNORMAL FINDINGS: Status: RESOLVED | Noted: 2019-03-28 | Resolved: 2024-02-21

## 2024-06-20 DIAGNOSIS — Z00.6 ENCOUNTER FOR EXAMINATION FOR NORMAL COMPARISON OR CONTROL IN CLINICAL RESEARCH PROGRAM: ICD-10-CM

## 2024-07-24 ENCOUNTER — APPOINTMENT (OUTPATIENT)
Dept: LAB | Facility: AMBULARY SURGERY CENTER | Age: 49
End: 2024-07-24

## 2024-07-24 DIAGNOSIS — Z00.6 ENCOUNTER FOR EXAMINATION FOR NORMAL COMPARISON OR CONTROL IN CLINICAL RESEARCH PROGRAM: ICD-10-CM

## 2024-07-24 PROCEDURE — 36415 COLL VENOUS BLD VENIPUNCTURE: CPT

## 2024-09-14 LAB
APOB+LDLR+PCSK9 GENE MUT ANL BLD/T: NOT DETECTED
BRCA1+BRCA2 DEL+DUP + FULL MUT ANL BLD/T: NOT DETECTED
MLH1+MSH2+MSH6+PMS2 GN DEL+DUP+FUL M: NOT DETECTED

## 2024-10-13 DIAGNOSIS — Z30.41 ENCOUNTER FOR SURVEILLANCE OF CONTRACEPTIVE PILLS: ICD-10-CM

## 2024-10-14 RX ORDER — NORGESTIMATE AND ETHINYL ESTRADIOL 7DAYSX3 28
1 KIT ORAL DAILY
Qty: 84 TABLET | Refills: 0 | Status: SHIPPED | OUTPATIENT
Start: 2024-10-14

## 2025-01-06 DIAGNOSIS — Z30.41 ENCOUNTER FOR SURVEILLANCE OF CONTRACEPTIVE PILLS: ICD-10-CM

## 2025-01-07 DIAGNOSIS — Z30.41 ENCOUNTER FOR SURVEILLANCE OF CONTRACEPTIVE PILLS: ICD-10-CM

## 2025-01-07 RX ORDER — NORGESTIMATE AND ETHINYL ESTRADIOL 7DAYSX3 28
1 KIT ORAL DAILY
Qty: 28 TABLET | Refills: 0 | Status: SHIPPED | OUTPATIENT
Start: 2025-01-07

## 2025-01-08 ENCOUNTER — TELEPHONE (OUTPATIENT)
Dept: OBGYN CLINIC | Facility: CLINIC | Age: 50
End: 2025-01-08

## 2025-01-08 RX ORDER — NORGESTIMATE AND ETHINYL ESTRADIOL 7DAYSX3 28
1 KIT ORAL DAILY
Qty: 84 TABLET | OUTPATIENT
Start: 2025-01-08

## 2025-04-01 DIAGNOSIS — Z30.41 ENCOUNTER FOR SURVEILLANCE OF CONTRACEPTIVE PILLS: ICD-10-CM

## 2025-04-02 DIAGNOSIS — Z30.41 ENCOUNTER FOR SURVEILLANCE OF CONTRACEPTIVE PILLS: ICD-10-CM

## 2025-04-02 RX ORDER — NORGESTIMATE AND ETHINYL ESTRADIOL 7DAYSX3 28
1 KIT ORAL DAILY
Qty: 84 TABLET | OUTPATIENT
Start: 2025-04-02

## 2025-04-02 RX ORDER — NORGESTIMATE AND ETHINYL ESTRADIOL 7DAYSX3 28
1 KIT ORAL DAILY
Qty: 28 TABLET | Refills: 0 | Status: SHIPPED | OUTPATIENT
Start: 2025-04-02